# Patient Record
Sex: MALE | Race: WHITE
[De-identification: names, ages, dates, MRNs, and addresses within clinical notes are randomized per-mention and may not be internally consistent; named-entity substitution may affect disease eponyms.]

---

## 2019-03-15 ENCOUNTER — HOSPITAL ENCOUNTER (EMERGENCY)
Dept: HOSPITAL 53 - M ED | Age: 29
Discharge: HOME | End: 2019-03-15
Payer: COMMERCIAL

## 2019-03-15 VITALS — WEIGHT: 192.9 LBS | HEIGHT: 72 IN | BODY MASS INDEX: 26.13 KG/M2

## 2019-03-15 VITALS — SYSTOLIC BLOOD PRESSURE: 125 MMHG | DIASTOLIC BLOOD PRESSURE: 69 MMHG
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
## 2019-03-15 DIAGNOSIS — S80.11XA: ICD-10-CM

## 2019-03-15 DIAGNOSIS — S70.01XA: Primary | ICD-10-CM

## 2019-03-15 DIAGNOSIS — S20.221A: ICD-10-CM

## 2019-03-15 PROCEDURE — 71101 X-RAY EXAM UNILAT RIBS/CHEST: CPT

## 2019-03-15 PROCEDURE — 73502 X-RAY EXAM HIP UNI 2-3 VIEWS: CPT

## 2019-03-15 PROCEDURE — 96372 THER/PROPH/DIAG INJ SC/IM: CPT

## 2019-03-15 PROCEDURE — 73590 X-RAY EXAM OF LOWER LEG: CPT

## 2019-03-15 PROCEDURE — 99283 EMERGENCY DEPT VISIT LOW MDM: CPT

## 2019-03-16 NOTE — REP
RIGHT HIP, TWO VIEWS:

 

HISTORY:  Hip pain.

 

There is no acute fracture or dislocation.  The joint space is normal in

appearance.

 

IMPRESSION:

 

There is no acute fracture or dislocation.

 

 

Electronically Signed by

Kelvin Strong MD 03/16/2019 08:08 A

## 2019-03-16 NOTE — REP
RIGHT TIBIA/FIBULA, FOUR VIEWS:

 

HISTORY:  Leg pain.

 

There is no acute fracture or dislocation.  The joint spaces are normal in

appearance.

 

IMPRESSION:

 

There is no hip fracture or dislocation.

 

 

Electronically Signed by

Kelvin Strong MD 03/18/2019 02:37 P

## 2019-03-16 NOTE — REP
UNILATERAL RIGHT RIBS, PA CHEST, FIVE VIEWS:

 

HISTORY: Right rib pain.

 

The lungs are clear. The heart is normal in size. The pulmonary vasculature is

normal in appearance. The bony structure is intact.

 

IMPRESSION:

 

No acute disease.

 

 

Electronically Signed by

Kelvin Strong MD 03/16/2019 08:17 A